# Patient Record
Sex: FEMALE | Race: WHITE | URBAN - METROPOLITAN AREA
[De-identification: names, ages, dates, MRNs, and addresses within clinical notes are randomized per-mention and may not be internally consistent; named-entity substitution may affect disease eponyms.]

---

## 2022-03-07 ENCOUNTER — OFFICE VISIT (OUTPATIENT)
Dept: URBAN - METROPOLITAN AREA CLINIC 40 | Facility: CLINIC | Age: 87
End: 2022-03-07
Payer: MEDICARE

## 2022-03-07 DIAGNOSIS — H40.013 OPEN ANGLE WITH BORDERLINE FINDINGS, LOW RISK, BILATERAL: ICD-10-CM

## 2022-03-07 DIAGNOSIS — H53.2 DIPLOPIA: Primary | ICD-10-CM

## 2022-03-07 DIAGNOSIS — H04.123 DRY EYE SYNDROME OF BILATERAL LACRIMAL GLANDS: ICD-10-CM

## 2022-03-07 PROCEDURE — 99204 OFFICE O/P NEW MOD 45 MIN: CPT | Performed by: OPTOMETRIST

## 2022-03-07 ASSESSMENT — INTRAOCULAR PRESSURE
OS: 10
OD: 10

## 2022-03-07 ASSESSMENT — KERATOMETRY
OD: 44.25
OS: 44.88

## 2022-03-07 NOTE — IMPRESSION/PLAN
Impression: Diplopia: H53.2. Plan: Discussed diagnosis in detail with patient. Will continue to observe condition and or symptoms. Consider prism in RX.